# Patient Record
Sex: FEMALE | Race: WHITE | URBAN - METROPOLITAN AREA
[De-identification: names, ages, dates, MRNs, and addresses within clinical notes are randomized per-mention and may not be internally consistent; named-entity substitution may affect disease eponyms.]

---

## 2017-01-16 ENCOUNTER — HISTORICAL (OUTPATIENT)
Dept: RADIOLOGY | Facility: HOSPITAL | Age: 82
End: 2017-01-16

## 2017-03-06 ENCOUNTER — HISTORICAL (OUTPATIENT)
Dept: LAB | Facility: HOSPITAL | Age: 82
End: 2017-03-06

## 2017-03-21 ENCOUNTER — HISTORICAL (OUTPATIENT)
Dept: LAB | Facility: HOSPITAL | Age: 82
End: 2017-03-21

## 2017-06-01 ENCOUNTER — HISTORICAL (OUTPATIENT)
Dept: RADIOLOGY | Facility: HOSPITAL | Age: 82
End: 2017-06-01

## 2017-06-07 ENCOUNTER — HISTORICAL (OUTPATIENT)
Dept: RADIOLOGY | Facility: HOSPITAL | Age: 82
End: 2017-06-07

## 2017-06-21 ENCOUNTER — HISTORICAL (OUTPATIENT)
Dept: LAB | Facility: HOSPITAL | Age: 82
End: 2017-06-21

## 2017-06-21 LAB
CRP SERPL HS-MCNC: 31.59 MG/L (ref 0–3)
ERYTHROCYTE [SEDIMENTATION RATE] IN BLOOD: 28 MM/HR (ref 0–20)
RHEUMATOID FACT SERPL-ACNC: <10 IU/ML (ref 0–15)
VIT B12 SERPL-MCNC: 1433 PG/ML (ref 193–986)

## 2017-07-17 ENCOUNTER — HISTORICAL (OUTPATIENT)
Dept: RADIOLOGY | Facility: HOSPITAL | Age: 82
End: 2017-07-17

## 2017-09-07 ENCOUNTER — HISTORICAL (OUTPATIENT)
Dept: RADIOLOGY | Facility: HOSPITAL | Age: 82
End: 2017-09-07

## 2017-12-12 ENCOUNTER — HISTORICAL (OUTPATIENT)
Dept: LAB | Facility: HOSPITAL | Age: 82
End: 2017-12-12

## 2017-12-12 LAB
BUN SERPL-MCNC: 14.4 MG/DL (ref 7–18)
CALCIUM SERPL-MCNC: 9.4 MG/DL (ref 8.5–10.1)
CHLORIDE SERPL-SCNC: 101 MMOL/L (ref 98–107)
CO2 SERPL-SCNC: 30.7 MMOL/L (ref 21–32)
CREAT SERPL-MCNC: 1.09 MG/DL (ref 0.6–1.3)
GLUCOSE SERPL-MCNC: 80 MG/DL (ref 74–106)
POTASSIUM SERPL-SCNC: 3.8 MMOL/L (ref 3.5–5.1)
SODIUM SERPL-SCNC: 140 MMOL/L (ref 136–145)

## 2017-12-21 ENCOUNTER — HISTORICAL (OUTPATIENT)
Dept: RADIOLOGY | Facility: HOSPITAL | Age: 82
End: 2017-12-21

## 2018-04-02 ENCOUNTER — HISTORICAL (OUTPATIENT)
Dept: RADIOLOGY | Facility: HOSPITAL | Age: 83
End: 2018-04-02

## 2018-04-03 ENCOUNTER — HISTORICAL (OUTPATIENT)
Dept: RADIOLOGY | Facility: HOSPITAL | Age: 83
End: 2018-04-03

## 2018-04-23 ENCOUNTER — HISTORICAL (OUTPATIENT)
Dept: RADIOLOGY | Facility: HOSPITAL | Age: 83
End: 2018-04-23

## 2018-06-13 ENCOUNTER — HISTORICAL (OUTPATIENT)
Dept: RADIOLOGY | Facility: HOSPITAL | Age: 83
End: 2018-06-13

## 2018-07-31 ENCOUNTER — HOSPITAL ENCOUNTER (OUTPATIENT)
Dept: MEDSURG UNIT | Facility: HOSPITAL | Age: 83
End: 2018-08-02
Attending: INTERNAL MEDICINE | Admitting: INTERNAL MEDICINE

## 2018-08-01 LAB
BUN SERPL-MCNC: 13 MG/DL (ref 7–18)
CALCIUM SERPL-MCNC: 8.1 MG/DL (ref 8.5–10.1)
CHLORIDE SERPL-SCNC: 105 MMOL/L (ref 98–107)
CO2 SERPL-SCNC: 30.7 MMOL/L (ref 21–32)
CREAT SERPL-MCNC: 0.97 MG/DL (ref 0.6–1.3)
CREAT/UREA NIT SERPL: 13
GLUCOSE SERPL-MCNC: 167 MG/DL (ref 74–106)
MAGNESIUM SERPL-MCNC: 1.6 MG/DL (ref 1.8–2.4)
POTASSIUM SERPL-SCNC: 3.6 MMOL/L (ref 3.5–5.1)
SODIUM SERPL-SCNC: 139 MMOL/L (ref 136–145)

## 2018-08-02 LAB
ABS NEUT (OLG): 9.32 X10(3)/MCL (ref 2.1–9.2)
BASOPHILS # BLD AUTO: 0.1 X10(3)/MCL (ref 0–0.2)
BASOPHILS NFR BLD AUTO: 1 %
BUN SERPL-MCNC: 8 MG/DL (ref 7–18)
CALCIUM SERPL-MCNC: 8.2 MG/DL (ref 8.5–10.1)
CHLORIDE SERPL-SCNC: 103 MMOL/L (ref 98–107)
CO2 SERPL-SCNC: 26.6 MMOL/L (ref 21–32)
CREAT SERPL-MCNC: 1 MG/DL (ref 0.6–1.3)
CREAT/UREA NIT SERPL: 8
EOSINOPHIL # BLD AUTO: 0.2 X10(3)/MCL (ref 0–0.9)
EOSINOPHIL NFR BLD AUTO: 2 %
ERYTHROCYTE [DISTWIDTH] IN BLOOD BY AUTOMATED COUNT: 15.8 % (ref 11.5–17)
GLUCOSE SERPL-MCNC: 160 MG/DL (ref 74–106)
HCT VFR BLD AUTO: 39.8 % (ref 37–47)
HGB BLD-MCNC: 12.5 GM/DL (ref 12–16)
IMM GRANULOCYTES # BLD AUTO: 0.04 % (ref 0–0.02)
IMM GRANULOCYTES NFR BLD AUTO: 0.3 % (ref 0–0.43)
LYMPHOCYTES # BLD AUTO: 0.7 X10(3)/MCL (ref 0.6–4.6)
LYMPHOCYTES NFR BLD AUTO: 6 %
MAGNESIUM SERPL-MCNC: 2 MG/DL (ref 1.8–2.4)
MCH RBC QN AUTO: 29.3 PG (ref 27–31)
MCHC RBC AUTO-ENTMCNC: 31.4 GM/DL (ref 33–36)
MCV RBC AUTO: 93.2 FL (ref 80–94)
MONOCYTES # BLD AUTO: 1.2 X10(3)/MCL (ref 0.1–1.3)
MONOCYTES NFR BLD AUTO: 10 %
NEUTROPHILS # BLD AUTO: 9.32 X10(3)/MCL (ref 1.4–7.9)
NEUTROPHILS NFR BLD AUTO: 81 %
PLATELET # BLD AUTO: 446 X10(3)/MCL (ref 130–400)
PMV BLD AUTO: 10.7 FL (ref 9.4–12.4)
POTASSIUM SERPL-SCNC: 5 MMOL/L (ref 3.5–5.1)
RBC # BLD AUTO: 4.27 X10(6)/MCL (ref 4.2–5.4)
SODIUM SERPL-SCNC: 135 MMOL/L (ref 136–145)
WBC # SPEC AUTO: 11.5 X10(3)/MCL (ref 4.5–11.5)

## 2018-08-13 ENCOUNTER — HISTORICAL (OUTPATIENT)
Dept: ADMINISTRATIVE | Facility: HOSPITAL | Age: 83
End: 2018-08-13

## 2018-08-13 LAB
ALBUMIN SERPL-MCNC: 1.6 GM/DL (ref 3.4–5)
ALP SERPL-CCNC: 106 UNIT/L (ref 46–116)
ALT SERPL-CCNC: 18 UNIT/L (ref 12–78)
AST SERPL-CCNC: 10 UNIT/L (ref 15–37)
BILIRUB SERPL-MCNC: 0.4 MG/DL (ref 0.2–1)
BILIRUBIN DIRECT+TOT PNL SERPL-MCNC: 0.17 MG/DL (ref 0–0.2)
BILIRUBIN DIRECT+TOT PNL SERPL-MCNC: 0.23 MG/DL (ref 0–0.8)
BUN SERPL-MCNC: 28.8 MG/DL (ref 7–18)
CALCIUM SERPL-MCNC: 7.9 MG/DL (ref 8.5–10.1)
CHLORIDE SERPL-SCNC: 103 MMOL/L (ref 98–107)
CHOLEST SERPL-MCNC: 154 MG/DL (ref 0–200)
CHOLEST/HDLC SERPL: 4.3 {RATIO} (ref 0–4)
CO2 SERPL-SCNC: 27.4 MMOL/L (ref 21–32)
CREAT SERPL-MCNC: 1.72 MG/DL (ref 0.6–1.3)
CREAT/UREA NIT SERPL: 17
ERYTHROCYTE [DISTWIDTH] IN BLOOD BY AUTOMATED COUNT: 15.9 % (ref 11.5–17)
FT4I SERPL CALC-MCNC: 2.11
GLUCOSE SERPL-MCNC: 75 MG/DL (ref 74–106)
HCT VFR BLD AUTO: 28.1 % (ref 37–47)
HDLC SERPL-MCNC: 36 MG/DL (ref 40–60)
HGB BLD-MCNC: 8.8 GM/DL (ref 12–16)
LDLC SERPL CALC-MCNC: 99 MG/DL (ref 0–129)
MCH RBC QN AUTO: 29 PG (ref 27–31)
MCHC RBC AUTO-ENTMCNC: 31.3 GM/DL (ref 33–36)
MCV RBC AUTO: 92.7 FL (ref 80–94)
PLATELET # BLD AUTO: 302 X10(3)/MCL (ref 130–400)
PMV BLD AUTO: 10.3 FL (ref 9.4–12.4)
POTASSIUM SERPL-SCNC: 4.2 MMOL/L (ref 3.5–5.1)
PROT SERPL-MCNC: 4.7 GM/DL (ref 6.4–8.2)
RBC # BLD AUTO: 3.03 X10(6)/MCL (ref 4.2–5.4)
SODIUM SERPL-SCNC: 138 MMOL/L (ref 136–145)
T3RU NFR SERPL: 39 % (ref 31–39)
T4 SERPL-MCNC: 5.4 MCG/DL (ref 4.7–13.3)
TRIGL SERPL-MCNC: 95 MG/DL
TSH SERPL-ACNC: 5.65 MIU/ML (ref 0.36–3.74)
VLDLC SERPL CALC-MCNC: 19 MG/DL
WBC # SPEC AUTO: 4.6 X10(3)/MCL (ref 4.5–11.5)

## 2018-08-14 ENCOUNTER — HISTORICAL (OUTPATIENT)
Dept: ADMINISTRATIVE | Facility: HOSPITAL | Age: 83
End: 2018-08-14

## 2018-08-14 LAB
APPEARANCE, UA: CLEAR
BACTERIA SPEC CULT: NORMAL
BILIRUB UR QL STRIP: NEGATIVE
COLOR UR: YELLOW
GLUCOSE (UA): NEGATIVE
HGB UR QL STRIP: NEGATIVE
KETONES UR QL STRIP: NEGATIVE
LEUKOCYTE ESTERASE UR QL STRIP: NEGATIVE
NITRITE UR QL STRIP: NEGATIVE
PH UR STRIP: 5.5 [PH] (ref 5–9)
PROT UR QL STRIP: NEGATIVE
RBC #/AREA URNS HPF: NORMAL /[HPF]
SP GR UR STRIP: 1.01 (ref 1–1.03)
SQUAMOUS EPITHELIAL, UA: NORMAL
UROBILINOGEN UR STRIP-ACNC: 0.2
WBC #/AREA URNS HPF: NORMAL /[HPF]

## 2018-08-22 ENCOUNTER — HISTORICAL (OUTPATIENT)
Dept: ADMINISTRATIVE | Facility: HOSPITAL | Age: 83
End: 2018-08-22

## 2018-08-22 LAB
BUN SERPL-MCNC: 49.5 MG/DL (ref 7–18)
CALCIUM SERPL-MCNC: 8.3 MG/DL (ref 8.5–10.1)
CHLORIDE SERPL-SCNC: 104 MMOL/L (ref 98–107)
CO2 SERPL-SCNC: 21.3 MMOL/L (ref 21–32)
CREAT SERPL-MCNC: 2.14 MG/DL (ref 0.6–1.3)
CREAT/UREA NIT SERPL: 23
ERYTHROCYTE [DISTWIDTH] IN BLOOD BY AUTOMATED COUNT: 16.3 % (ref 11.5–17)
GLUCOSE SERPL-MCNC: 73 MG/DL (ref 74–106)
HCT VFR BLD AUTO: 34.1 % (ref 37–47)
HGB BLD-MCNC: 10.4 GM/DL (ref 12–16)
MCH RBC QN AUTO: 28.7 PG (ref 27–31)
MCHC RBC AUTO-ENTMCNC: 30.5 GM/DL (ref 33–36)
MCV RBC AUTO: 94.2 FL (ref 80–94)
PLATELET # BLD AUTO: 450 X10(3)/MCL (ref 130–400)
PMV BLD AUTO: 9.9 FL (ref 9.4–12.4)
POTASSIUM SERPL-SCNC: 5.4 MMOL/L (ref 3.5–5.1)
RBC # BLD AUTO: 3.62 X10(6)/MCL (ref 4.2–5.4)
SODIUM SERPL-SCNC: 140 MMOL/L (ref 136–145)
WBC # SPEC AUTO: 7.7 X10(3)/MCL (ref 4.5–11.5)

## 2018-08-27 ENCOUNTER — HISTORICAL (OUTPATIENT)
Dept: ADMINISTRATIVE | Facility: HOSPITAL | Age: 83
End: 2018-08-27

## 2022-04-11 ENCOUNTER — HISTORICAL (OUTPATIENT)
Dept: ADMINISTRATIVE | Facility: HOSPITAL | Age: 87
End: 2022-04-11

## 2022-04-28 VITALS
WEIGHT: 115 LBS | HEIGHT: 62 IN | SYSTOLIC BLOOD PRESSURE: 126 MMHG | DIASTOLIC BLOOD PRESSURE: 64 MMHG | BODY MASS INDEX: 21.16 KG/M2

## 2022-04-30 NOTE — H&P
Patient:   Jade Rocha            MRN: [id number]            FIN: [id number]               Age:   89 years     Sex:  Female     :  1927   Associated Diagnoses:   None   Author:   Candido Vincent MD      Chief Complaint   2018 8:41 CDT       nausea, vomiting, diarrhea over last week, weakness, does live alone and family is req to go to nursing home for care, was at dr dawkins office, sent for wkup in [place], decline ability to care for self at home        History of Present Illness   CC: Vomiting and diarrhea  The patient has a hx of moderate dementia and followed by dr dawkins.  family reports weakness, falls, vomiting and diarrhea; they state that food is left out for days and it appears she has been eating this.  she does not cook or drive any more.  they found home smelling of feces and urine.  She has had a general decline over the last 6 months and family does have medical and financial POA.  they have been working with  [facility] for placement. the patient denies vomiting and diarrhea but is alert and oriented. she had an excellent workup with dr dawkins and agrees with placement. Her falls date back at least to  from what I can see when she saw cardiologist for weakness. She is hypokalemic, mildly azotemic and has an elevated WBC with mild neutrophilia.  She will be placed in observation for fluids and potassium.  She should be able to be DC in AM to NH if corrected.       Review of Systems   Constitutional:  Weakness, No fever, No chills.    Eye:  No blurring, No double vision.    Ear/Nose/Mouth/Throat:  No nasal congestion, No sore throat.    Respiratory:  No shortness of breath, No cough.    Cardiovascular:  No palpitations, No bradycardia, No peripheral edema.    Gastrointestinal:  Vomiting, Diarrhea, No nausea, No constipation, No heartburn.    Genitourinary:  No dysuria, No hematuria.    Hematology/Lymphatics:  No bruising tendency, No bleeding tendency.    Endocrine:  No excessive  thirst, No polyuria.    Immunologic:  Not immunocompromised, No recurrent fevers.    Musculoskeletal:  Joint pain, No neck pain.    Integumentary:  Abrasions, No rash, No pruritus.    Neurologic:  Alert and oriented X4, Confusion.    Psychiatric:  No anxiety, No depression.    All other systems are negative      Health Status   Current medications:  (Selected)   Inpatient Medications  Ordered  Toradol: 30 mg, form: Injection, IM, Once, first dose 03/26/18 16:26:00 CDT, stop date 03/26/18 16:26:00 CDT, STAT  Tylenol: 650 mg, form: Tab, Oral, Once, first dose 03/26/18 16:25:00 CDT, stop date 03/26/18 16:25:00 CDT, STAT  bacitracin 500 units/g topical ointment: 1 malia, form: Ointment, TOP, QID, first dose 07/31/18 10:02:00 CDT  Prescriptions  Prescribed  Flonase 50 mcg/inh nasal spray: 1 spray(s), Nasal, BID, in each nostril, # 1 bottle(s), 0 Refill(s), Pharmacy: [id number]  Neurontin 300 mg oral capsule: 300 mg = 1 cap(s), Oral, TID, take with food, # 90 cap(s), 0 Refill(s)  Documented Medications  Documented  DULOXETINE HCL DR 30 MG CAP:   LYRICA 25 MG CAPSULE: 25 mg = 1 cap(s), Oral, TID  Lipoflavonoid oral capsule: 1 cap(s), Oral, Daily, 0 Refill(s)  PREDNISONE 20 MG TABLET:   PREDNISONE 5 MG TABLET:   RISEDRONATE SODIUM 35 MG TAB:   VALSARTAN 320 MG TABLET: 320 mg = 1 tab(s), Oral, Daily  citalopram 20 mg oral tablet: 20 mg = 1 tab(s), Oral, Daily, # 30 tab(s), 0 Refill(s)  cloniDINE 0.2 mg oral tablet: 1 tablet, Oral, BID, 0 Refill(s)  hydrochlorothiazide 12.5 mg oral tablet: 12.5 mg = 1 tab(s), Oral, Daily, # 30 tab(s), 2 Refill(s)  mirtazapine 15 mg oral tablet: 15 mg = 1 tab(s), Oral, Once a day (at bedtime), # 30 tab(s), 0 Refill(s)      Histories   Past Medical History:    Resolved  hysterectomy (406086272): Onset in 1967 at 40 years.  Resolved.  appendectomy (313003948): Onset in 1947 at 20 years.  Resolved.  Tonsillectomy (767084133): Onset in 1944 at 17 years.  Resolved.  Hypertension (26162545):   Resolved.  H/O: HTN (V12.59):  Resolved.  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   Family History:    Primary malignant neoplasm of lung  Sister  Leukemia  Father     Procedure history:    Left Heart Cath w/COR Angio Ventriculogr performed by Frances Chu MD on 1/20/2014 at 86 Years.  Comments:  1/20/2014 07:Miriam Arellano RN  auto-populated from documented surgical case  Renal Bilateral W Aortogram performed by Frances Chu MD on 1/20/2014 at 86 Years.  Comments:  1/20/2014 07:Miriam Arellano RN  auto-populated from documented surgical case  hyst.  polypectomy.  Appendectomy (SNOMED CT 122960792).  Tonsillectomy (SNOMED CT 387202020).   Social History        Social & Psychosocial Habits    Alcohol  03/04/2012 Risk Assessment: Denies Alcohol Use    04/25/2013  Use: Current    Type: Wine    Frequency: Daily    Average drinks per episode in last year: 1    Has alcohol use interfered with work or home life? No    Do you ever drink more than intended? No    Has anyone been hurt or at risk by your drinking? No    Ready to change: No    Concerns about alcohol use in household: No    Employment/School  03/04/2012 Risk Assessment: Not employed or in school    Exercise  03/04/2012 Risk Assessment: Regular exercise    Home/Environment  03/04/2012 Risk Assessment: No Risk    Nutrition/Health  03/04/2012 Risk Assessment: No Risk    Other  03/04/2012 Risk Assessment: No Risk    Sexual  03/04/2012 Risk Assessment: No Risk    Substance Abuse  03/04/2012 Risk Assessment: Denies Substance Abuse    03/20/2018  Use: Never    Tobacco  03/04/2012 Risk Assessment: Denies Tobacco Use    06/07/2017  Use: Never smoker  .        Physical Examination   General:  Alert and oriented, No acute distress.    Eye:  Pupils are equal, round and reactive to light, Extraocular movements are intact.    HENT:  Normocephalic, Oral mucosa is moist.    Neck:  Supple, Non-tender.    Respiratory:  Lungs are clear to auscultation,  Respirations are non-labored.    Cardiovascular:  Normal rate, Regular rhythm.    Gastrointestinal:  Soft, Non-tender, Non-distended, Normal bowel sounds, No organomegaly.       No qualifying data available   Genitourinary:  No costovertebral angle tenderness, No inguinal tenderness.    Lymphatics:  No lymphadenopathy neck, axilla, groin.    Musculoskeletal:  Normal range of motion, weak bilaterally; able to lift legs off bed against gravity and some resistancye; 4/5 bilaterally.    Integumentary:  Warm, Dry, multiple healing scabs to arms from falls; multiple bruises to arms, legs consistent with falls.    Neurologic:  Alert, Oriented.    Cognition and Speech:  Oriented, Speech clear and coherent.    Psychiatric:  Cooperative, Appropriate mood & affect.       Review / Management   Results review:     No qualifying data available.    Laboratory Results   Last 5 Days Lab Results : PowerNote Discrete Results   7/31/2018 10:15 CDT      UA Appear                 Clear                             UA Color                  Marla                             UA Spec Grav              1.020                             UA Bili                   Moderate                             UA pH                     5.5                             UA Urobilinogen           2.0                             UA Blood                  Negative                             UA Glucose                Negative                             UA Ketones                15                             UA Protein                30                             UA Nitrite                Negative                             UA Leuk Est               Small                             UA WBC                    2-4 /HPF                             UA RBC                    None Seen                             UA Bacteria               Rare                             UA Squam Epithelial       Rare    7/31/2018 9:49 CDT       WBC                       15.0  x10(3)/mcL  HI                             RBC                       4.13 x10(6)/mcL  LOW                             Hgb                       12.2 gm/dL                             Hct                       37.8 %                             Platelet                  373 x10(3)/mcL                             MCV                       91.5 fL                             MCH                       29.5 pg                             MCHC                      32.3 gm/dL  LOW                             RDW                       16.7 %                             MPV                       9.1 fL  LOW                             Abs Neut                  12.70 x10(3)/mcL  HI                             Neutro Auto               85 %  NA                             Lymph Auto                4 %  NA                             Mono Auto                 10 %  NA                             Eos Auto                  1 %  NA                             Abs Eos                   0.1 x10(3)/mcL                             Basophil Auto             1 %  NA                             Abs Neutro                12.70 x10(3)/mcL  HI                             Abs Lymph                 0.6 x10(3)/mcL                             Abs Mono                  1.5 x10(3)/mcL  HI                             Abs Baso                  0.1 x10(3)/mcL                             Sodium Lvl                141 mmol/L                             Potassium Lvl             2.6 mmol/L  LOW                             Chloride                  101 mmol/L                             CO2                       32.1 mmol/L  HI                             Calcium Lvl               8.5 mg/dL                             Glucose Lvl               127 mg/dL  HI                             BUN                       15.4 mg/dL                             Creatinine                1.26 mg/dL                             eGFR-AA                   51 mL/min/1.73  m2  NA                             eGFR-ELISA                  42 mL/min/1.73 m2  NA                             Bili Total                0.8 mg/dL                             Bili Direct               0.33 mg/dL  HI                             Bili Indirect             0.47 mg/dL                             AST                       15 unit/L                             ALT                       8 unit/L  LOW                             Alk Phos                  117 unit/L  HI                             Total Protein             6.2 gm/dL  LOW                             Albumin Lvl               2.20 gm/dL  LOW                             Globulin                  4.00 gm/dL  HI                             A/G Ratio                 0.6 ratio  LOW        Diagnostic Findings:  * Final Report *    Reason For Exam  Abdominal Pain    Radiology Report  XR Abdomen Flat and Erect     REASON FOR STUDY: Abdominal Pain     TECHNIQUE: Supine and upright radiographs of the abdomen     COMPARISON: [facility] from 9/6/2014     FINDINGS: Scattered gas-filled segments of bowel with no definitive  bowel dilatation. No significant air-fluid levels or findings for  pneumoperitoneum. Small volume stool. Scattered vascular  calcifications. Degenerative changes in the spine.     IMPRESSION: Nonobstructive bowel gas pattern.       Signature Line  Electronically Signed By: Alexander Nguyễn MD  Date/Time Signed: 07/31/2018 11:48      This document has an image    Result type: XR Abdomen Flat and Erect  Result date: July 31, 2018 11:41 CDT  Result status: Auth (Verified)  Result title: XR Abdomen Flat and Erect  Performed by: Alexander Nguyễn MD on July 31, 2018 11:46 CDT  Verified by: Alexander Nguyễn MD on July 31, 2018 11:48 CDT  Encounter info: [id number], [facility], Emergency, 7/31/2018 -        * Final Report *    Reason For Exam  possible nursing home;Other (please specify)    Radiology Report  XR Chest 2 Views     REASON FOR STUDY:  possible nursing home;Other (please specify)     TECHNIQUE: Frontal and lateral radiographs of the chest     COMPARISON: April 2, 2018     FINDINGS:      The cardiomediastinal silhouette and pulmonary vasculature are normal.     Bibasilar atelectatic changes greater on the left than right. The  remaining lungs and pleural spaces are clear. Chronic rib fracture  deformities on the left.     IMPRESSION:      No acute cardiopulmonary process.           Signature Line  Electronically Signed By: Eric Kemp DO  Date/Time Signed: 07/31/2018 11:14      This document has an image    Result type: XR Chest 2 Views  Result date: July 31, 2018 11:04 CDT  Result status: Auth (Verified)  Result title: XR Chest 2 Views  Performed by: Eric Kemp DO on July 31, 2018 11:12 CDT  Verified by: Eric Kemp DO on July 31, 2018 11:14 CDT  Encounter info: [id number], [facility], Emergency, 7/31/2018 -       .       Impression and Plan   vomiting and diarrhea likley represents gastroenteritis from eating food left out for an extended period of time. will start full liquids; replace potassium with fluids; check mag and BMP in AM. start full liquids and advance diet as tolerated.   Hypokalemia: secondary to above. repeat in AM  dementia with significant decline in abilty to care for oneself at home. Hopefully DC in AM to NH; family has all set up and possible bed tomorrow.  debility: therapy can be consulted at NH;   HTN: continue meds; check orthostatics Adeel  unclear resuscitation status; POA to decide

## 2022-04-30 NOTE — DISCHARGE SUMMARY
Patient:   Jade Rocha            MRN: [id number]            FIN: [id number]               Age:   89 years     Sex:  Female     :  1927   Associated Diagnoses:   None   Author:   Carlton YOON, Candido      Chief Complaint      History of Present Illness   CC: Vomiting and diarrhea  The patient has a hx of moderate dementia and followed by dr dawkins.  family reports weakness, falls, vomiting and diarrhea; they state that food is left out for days and it appears she has been eating this.  she does not cook or drive any more.  they found home smelling of feces and urine.  She has had a general decline over the last 6 months and family does have medical and financial POA.  they have been working with  [facility] for placement. the patient denies vomiting and diarrhea but is alert and oriented. she had an excellent workup with dr dawkins and agrees with placement. Her falls date back at least to  from what I can see when she saw cardiologist for weakness. She is hypokalemic, mildly azotemic and has an elevated WBC with mild neutrophilia.  She will be placed in observation for fluids and potassium.  She should be able to be DC in AM to NH if corrected.     SUMMARY:  heart rate still elevated; BP elevated at 180s' but off hctz; she only ate a few bites this AM; will repeat bloodwork; start hctz; give 2gm mag; continue to replace potassium but avoid oral due to recent vomiting;     Date of Service: : CC: vomiting/diarrhea/appetite: No vomiting or diarrhea since being admitted; still with poor appetite. BP and heart rate better. still eating poorly but does not report any concerning symptoms or red flags at this time.       Health Status   Current medications:  (Selected)   Inpatient Medications  Ordered  Toradol: 30 mg, form: Injection, IM, Once, first dose 18 16:26:00 CDT, stop date 18 16:26:00 CDT, STAT  Tylenol: 650 mg, form: Tab, Oral, Once, first dose 18 16:25:00 CDT, stop date 18  16:25:00 CDT, STAT  VALSARTAN 320mg tab: 1 dose, form: Dose, Oral, Daily, first dose 08/01/18 9:00:00 CDT  hydrochlorothiazide-triamterene 25 mg-37.5 mg oral capsule: 1 tab(s), form: Tab, Oral, Daily, first dose 08/01/18 13:00:00 CDT  Documented Medications  Documented  VALSARTAN 320 MG TABLET: 320 mg = 1 tab(s), Oral, Daily  hydrochlorothiazide-triamterene 25 mg-37.5 mg oral tablet: 1 tab(s), Oral, Daily, 0 Refill(s)      Physical Examination   General:  Alert and oriented, No acute distress.    Eye:  Pupils are equal, round and reactive to light, Extraocular movements are intact.    Respiratory:  Lungs are clear to auscultation, Respirations are non-labored.    Cardiovascular:  Normal rate, Regular rhythm.    Gastrointestinal:  Soft, Non-tender, Non-distended, Normal bowel sounds, No organomegaly.       Vital Signs (last 24 hrs)_____  Last Charted___________  Temp Oral     L 35.7DegC  (AUG 02 11:00)  Heart Rate Peripheral   80 bpm  (AUG 02 11:00)  Resp Rate         20 br/min  (AUG 02 11:00)  SBP      H 151mmHg  (AUG 02 11:00)  DBP      82 mmHg  (AUG 02 11:00)  SpO2      95 %  (AUG 02 11:00)  Weight      43.7 kg  (AUG 02 05:00)     Cognition and Speech:  Oriented, Speech clear and coherent.    Psychiatric:  Cooperative, Appropriate mood & affect.       Review / Management   Results review:     Labs (Last four charted values)  WBC                  11.5 (AUG 02)   Hgb                  12.5 (AUG 02)   Hct                  39.8 (AUG 02)   Plt                  H 446 (AUG 02)   Na                   L 135 (AUG 02) 139 (AUG 01)   K                    5.0 (AUG 02) 3.6 (AUG 01)   CO2                  26.6 (AUG 02) 30.7 (AUG 01)   Cl                   103 (AUG 02) 105 (AUG 01)   Cr                   1.00 (AUG 02) 0.97 (AUG 01)   BUN                  8.0 (AUG 02) 13.0 (AUG 01)   Glucose Random       H 160 (AUG 02) H 167 (AUG 01) .    Laboratory Results   Last 5 Days Lab Results : PowerNote Discrete Results   8/2/2018 5:20 CDT         WBC                       11.5 x10(3)/mcL                             RBC                       4.27 x10(6)/mcL                             Hgb                       12.5 gm/dL                             Hct                       39.8 %                             Platelet                  446 x10(3)/mcL  HI                             MCV                       93.2 fL                             MCH                       29.3 pg                             MCHC                      31.4 gm/dL  LOW                             RDW                       15.8 %                             MPV                       10.7 fL                             Abs Neut                  9.32 x10(3)/mcL  HI                             Neutro Auto               81 %  NA                             Lymph Auto                6 %  NA                             Mono Auto                 10 %  NA                             Eos Auto                  2 %  NA                             Abs Eos                   0.2 x10(3)/mcL                             Basophil Auto             1 %  NA                             Abs Neutro                9.32 x10(3)/mcL  HI                             Abs Lymph                 0.7 x10(3)/mcL                             Abs Mono                  1.2 x10(3)/mcL                             Abs Baso                  0.1 x10(3)/mcL                             IG%                       0.300 %                             IG#                       0.0400 %  HI                             Sodium Lvl                135 mmol/L  LOW                             Potassium Lvl             5.0 mmol/L                             Chloride                  103 mmol/L                             CO2                       26.6 mmol/L                             Calcium Lvl               8.2 mg/dL  LOW                             Magnesium Lvl             2.0 mg/dL                             Glucose Lvl                160 mg/dL  HI                             BUN                       8.0 mg/dL                             Creatinine                1.00 mg/dL                             BUN/Creat Ratio           8  NA                             eGFR-AA                   >60 mL/min/1.73 m2  NA                             eGFR-ELISA                  55 mL/min/1.73 m2  NA    8/1/2018 5:10 CDT        Sodium Lvl                139 mmol/L                             Potassium Lvl             3.6 mmol/L                             Chloride                  105 mmol/L                             CO2                       30.7 mmol/L                             Calcium Lvl               8.1 mg/dL  LOW                             Magnesium Lvl             1.6 mg/dL  LOW                             Glucose Lvl               167 mg/dL  HI                             BUN                       13.0 mg/dL                             Creatinine                0.97 mg/dL                             BUN/Creat Ratio           13  NA                             eGFR-AA                   >60 mL/min/1.73 m2  NA                             eGFR-ELISA                  57 mL/min/1.73 m2  NA    7/31/2018 10:15 CDT      UA Appear                 Clear                             UA Color                  Marla                             UA Spec Grav              1.020                             UA Bili                   Moderate                             UA pH                     5.5                             UA Urobilinogen           2.0                             UA Blood                  Negative                             UA Glucose                Negative                             UA Ketones                15                             UA Protein                30                             UA Nitrite                Negative                             UA Leuk Est               Small                             UA WBC                    2-4  /HPF                             UA RBC                    None Seen                             UA Bacteria               Rare                             UA Squam Epithelial       Rare    7/31/2018 9:49 CDT       WBC                       15.0 x10(3)/mcL  HI                             RBC                       4.13 x10(6)/mcL  LOW                             Hgb                       12.2 gm/dL                             Hct                       37.8 %                             Platelet                  373 x10(3)/mcL                             MCV                       91.5 fL                             MCH                       29.5 pg                             MCHC                      32.3 gm/dL  LOW                             RDW                       16.7 %                             MPV                       9.1 fL  LOW                             Abs Neut                  12.70 x10(3)/mcL  HI                             Neutro Auto               85 %  NA                             Lymph Auto                4 %  NA                             Mono Auto                 10 %  NA                             Eos Auto                  1 %  NA                             Abs Eos                   0.1 x10(3)/mcL                             Basophil Auto             1 %  NA                             Abs Neutro                12.70 x10(3)/mcL  HI                             Abs Lymph                 0.6 x10(3)/mcL                             Abs Mono                  1.5 x10(3)/mcL  HI                             Abs Baso                  0.1 x10(3)/mcL                             Sodium Lvl                141 mmol/L                             Potassium Lvl             2.6 mmol/L  LOW                             Chloride                  101 mmol/L                             CO2                       32.1 mmol/L  HI                             Calcium Lvl               8.5 mg/dL                              Glucose Lvl               127 mg/dL  HI                             BUN                       15.4 mg/dL                             Creatinine                1.26 mg/dL                             eGFR-AA                   51 mL/min/1.73 m2  NA                             eGFR-ELISA                  42 mL/min/1.73 m2  NA                             Bili Total                0.8 mg/dL                             Bili Direct               0.33 mg/dL  HI                             Bili Indirect             0.47 mg/dL                             AST                       15 unit/L                             ALT                       8 unit/L  LOW                             Alk Phos                  117 unit/L  HI                             Total Protein             6.2 gm/dL  LOW                             Albumin Lvl               2.20 gm/dL  LOW                             Globulin                  4.00 gm/dL  HI                             A/G Ratio                 0.6 ratio  LOW           Impression and Plan   vomiting and diarrhea likley represents gastroenteritis from eating food left out for an extended period of time. will start full liquids; replace potassium with fluids; check mag and BMP in AM. start full liquids and advance diet as tolerated. 8/1; advance diet; zofran prn; no more diarrhea; 8/2: no issues while here; suggestive of issues with dementia and eating food left out for days in hot room on table.   Loss of appetite: would suggest starting megace if persist but hopefully will  with proper care on her own.   Hypokalemia: secondary to above. repeat in AM; 8/1: continue fluids; mag 2gm IV today; repeat labs in AM; IMPROVED  dementia with significant decline in abilty to care for oneself at home. Hopefully DC in AM to NH; family has all set up and possible bed tomorrow. 8/2: She would likely benefit from therapy but would be cautious with appetite.  Ill defer to PCP;   debility: therapy  can be consulted at NH;   HTN: continue meds; check orthostatics Adeel; 8/1: restart hctz; monitor renal function. 8/2: much better controlled; started hctz; has some occasional elevation in heart rate which is above goal; last TSH was a little on the high side of normal;suggest repeat studies in 4-6 weeks;   unclear resuscitation status; POA to decide  OK to transfer to NH;   time spent in DC process 34 minutes